# Patient Record
Sex: MALE | Race: BLACK OR AFRICAN AMERICAN
[De-identification: names, ages, dates, MRNs, and addresses within clinical notes are randomized per-mention and may not be internally consistent; named-entity substitution may affect disease eponyms.]

---

## 2019-02-17 ENCOUNTER — HOSPITAL ENCOUNTER (EMERGENCY)
Dept: HOSPITAL 63 - ER | Age: 29
Discharge: HOME | End: 2019-02-17
Payer: COMMERCIAL

## 2019-02-17 VITALS — DIASTOLIC BLOOD PRESSURE: 87 MMHG | SYSTOLIC BLOOD PRESSURE: 130 MMHG

## 2019-02-17 VITALS — WEIGHT: 315 LBS | BODY MASS INDEX: 44.1 KG/M2 | HEIGHT: 71 IN
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
## 2019-02-17 DIAGNOSIS — S61.012A: Primary | ICD-10-CM

## 2019-02-17 PROCEDURE — 90471 IMMUNIZATION ADMIN: CPT

## 2019-02-17 PROCEDURE — 90715 TDAP VACCINE 7 YRS/> IM: CPT

## 2019-02-17 PROCEDURE — 12001 RPR S/N/AX/GEN/TRNK 2.5CM/<: CPT

## 2019-02-17 NOTE — PHYS DOC
Past History


Past Medical History:  Other


Past Surgical History:  No Surgical History


Alcohol Use:  None


Drug Use:  None





Adult General


Chief Complaint


Chief Complaint:  LACERATION/AVULSION





HPI


HPI





Patient is a 28 year old right-handed male who presents with pinning of 

laceration of left thumb. Patient states he was at work and cutting a 

cantaloupe and cut tip of left thumb. Patient denies other injuries. Patient is 

not up-to-date with tetanus immunization.





Review of Systems


Review of Systems





Constitutional: Denies fever or chills []


Eyes: Denies change in visual acuity, redness, or eye pain []


HENT: Denies nasal congestion or sore throat []


Respiratory: Denies cough or shortness of breath []


Cardiovascular: No additional information not addressed in HPI []


GI: Denies abdominal pain, nausea, vomiting, bloody stools or diarrhea []


: Denies dysuria or hematuria []


Musculoskeletal: Denies back pain or joint pain []


Integument: Denies rash or skin lesions []


Neurologic: Denies headache, focal weakness or sensory changes []


Endocrine: Denies polyuria or polydipsia []





All other systems were reviewed and found to be within normal limits, except as 

documented in this note.





Current Medications


Current Medications





Current Medications








 Medications


  (Trade)  Dose


 Ordered  Sig/Jd  Start Time


 Stop Time Status Last Admin


Dose Admin


 


 Diphtheria/


 Tetanus/Acell


 Pertussis


  (Boostrix)  0.5 ml  ONCE ONCE  2/17/19 11:00


 2/17/19 11:02 DC  














Allergies


Allergies





Allergies








Coded Allergies Type Severity Reaction Last Updated Verified


 


  No Known Drug Allergies    2/17/19 No











Physical Exam


Physical Exam





Constitutional: Well developed, well nourished, no acute distress, non-toxic 

appearance. []


HENT: Normocephalic, atraumatic


Eyes: PERRLA, EOMI, conjunctiva normal, no discharge. [] 


Neck: Normal range of motion, no tenderness, supple, no stridor. [] 


Cardiovascular:Heart rate regular rhythm, no murmur []


Lungs & Thorax:  Bilateral breath sounds clear to auscultation []


Skin: Warm, dry, no erythema, no rash. [] 


Back: No tenderness, no CVA tenderness. [] 


Extremities: Laceration of tip of left thumb with mild bleeding without change 

of color of flap skin to pale.


Neurologic: Alert and oriented X 3, normal motor function, normal sensory 

function, no focal deficits noted. []


Psychologic: Affect normal, judgement normal, mood normal. []





Current Patient Data


Vital Signs





 Vital Signs








  Date Time  Temp Pulse Resp B/P (MAP) Pulse Ox O2 Delivery O2 Flow Rate FiO2


 


2/17/19 10:46 98.5 62 20  100 Room Air  











EKG


EKG


[]





Radiology/Procedures


Radiology/Procedures


[]





Course & Med Decision Making


Course & Med Decision Making


Evaluation of patient in ER showed 28-year-old right-handed male patient with 

laceration of left thumb that was repaired with Dermabond and Steri-Strip. Tdap 

was given in ER.





Dragon Disclaimer


Dragon Disclaimer


This electronic medical record was generated, in whole or in part, using a 

voice recognition dictation system.





Departure


Departure:


Impression:  


 Primary Impression:  


 Laceration of left thumb


Disposition:  01 HOME, SELF-CARE (at 1117)


Condition:  STABLE


Referrals:  


OTTONIEL RINCON MD (PCP)


Patient Instructions:  Fingertip Laceration, Tissue Adhesive Wound Care





Additional Instructions:  


Keep wound clean and dry


Follow-up with your primary care physician in 3-5 days


Return to ER if not getting better





Laceration Repair


Lac Repair


Indication: Left thumb laceration





Procedure: The patient was placed in the appropriate position and flap 

laceration of left thumb was repaired with Dermabond and Steri-Strip. Total 

repaired wound length: 1 cm 


Other Items: [OTHER ITEMS]





The patient tolerated the procedure well 


Complications: None











PEÑA BECK MD Feb 17, 2019 11:18